# Patient Record
Sex: FEMALE | Race: OTHER | Employment: STUDENT
[De-identification: names, ages, dates, MRNs, and addresses within clinical notes are randomized per-mention and may not be internally consistent; named-entity substitution may affect disease eponyms.]

---

## 2024-10-14 ENCOUNTER — HOSPITAL ENCOUNTER (EMERGENCY)
Facility: HOSPITAL | Age: 19
Discharge: HOME | End: 2024-10-14
Payer: MEDICAID

## 2024-10-14 VITALS
DIASTOLIC BLOOD PRESSURE: 71 MMHG | BODY MASS INDEX: 26.58 KG/M2 | HEART RATE: 67 BPM | SYSTOLIC BLOOD PRESSURE: 114 MMHG | OXYGEN SATURATION: 99 % | RESPIRATION RATE: 20 BRPM | WEIGHT: 150 LBS | TEMPERATURE: 99.2 F | HEIGHT: 63 IN

## 2024-10-14 DIAGNOSIS — M72.2 PLANTAR FASCIITIS OF RIGHT FOOT: Primary | ICD-10-CM

## 2024-10-14 DIAGNOSIS — M72.2 PLANTAR FASCIITIS OF LEFT FOOT: ICD-10-CM

## 2024-10-14 PROCEDURE — 99283 EMERGENCY DEPT VISIT LOW MDM: CPT

## 2024-10-14 RX ORDER — ESCITALOPRAM OXALATE 20 MG/1
20 TABLET ORAL DAILY
COMMUNITY

## 2024-10-14 RX ORDER — IBUPROFEN 600 MG/1
600 TABLET, FILM COATED ORAL EVERY 6 HOURS PRN
Qty: 20 TABLET | Refills: 0 | Status: SHIPPED | OUTPATIENT
Start: 2024-10-14

## 2024-10-14 RX ORDER — ARIPIPRAZOLE 15 MG/1
15 TABLET ORAL DAILY
COMMUNITY

## 2024-10-14 ASSESSMENT — PAIN DESCRIPTION - DESCRIPTORS: DESCRIPTORS: SHARP;THROBBING

## 2024-10-14 ASSESSMENT — PAIN DESCRIPTION - ORIENTATION: ORIENTATION: LEFT;RIGHT

## 2024-10-14 ASSESSMENT — PAIN - FUNCTIONAL ASSESSMENT: PAIN_FUNCTIONAL_ASSESSMENT: 0-10

## 2024-10-14 ASSESSMENT — PAIN SCALES - GENERAL: PAINLEVEL_OUTOF10: 10

## 2024-10-14 ASSESSMENT — PAIN DESCRIPTION - LOCATION: LOCATION: FOOT

## 2024-10-14 NOTE — ED PROVIDER NOTES
[facility] EMERGENCY DEPT  EMERGENCY DEPARTMENT HISTORY AND PHYSICAL EXAM      Date: 10/14/2024  Patient Name: Mayra Smith  MRN: [id number]  YOB: 2005  Date of evaluation: 10/14/2024  Provider: AALIYAH Calloway NP   Note Started: 7:59 PM EDT 10/14/24    HISTORY OF PRESENT ILLNESS     Chief Complaint   Patient presents with    Foot Pain       History Provided By: Patient    HPI: Mayra Smith is a 19 y.o. female with a past medical history as listed below presents to the ER with foot pain.  Patient had bilateral foot pain.  Patient states it started about a week ago.  Patient denies any trauma.    PAST MEDICAL HISTORY   Past Medical History:  Past Medical History:   Diagnosis Date    ADHD     Anxiety     Bipolar 1 disorder (HCC)     Depression        Past Surgical History:  History reviewed. No pertinent surgical history.    Family History:  History reviewed. No pertinent family history.    Social History:  Social History     Tobacco Use    Smoking status: Never    Smokeless tobacco: Never   Vaping Use    Vaping status: Never Used   Substance Use Topics    Alcohol use: Never    Drug use: Never       Allergies:  Allergies   Allergen Reactions    Amoxicillin Anaphylaxis       PCP: No primary care provider on file.    Current Meds:   No current facility-administered medications for this encounter.     Current Outpatient Medications   Medication Sig Dispense Refill    escitalopram (LEXAPRO) 20 MG tablet Take 1 tablet by mouth daily      ARIPiprazole (ABILIFY) 15 MG tablet Take 1 tablet by mouth daily      ibuprofen (IBU) 600 MG tablet Take 1 tablet by mouth every 6 hours as needed for Pain 20 tablet 0       Social Determinants of Health:   Social Determinants of Health     Tobacco Use: Low Risk  (10/14/2024)    Patient History     Smoking Tobacco Use: Never     Smokeless Tobacco Use: Never     Passive Exposure: Not on file   Alcohol Use: Not on file   Financial Resource Strain: Not on file   Food

## 2024-10-14 NOTE — ED TRIAGE NOTES
Patient reporting bilateral foot pain that started about a week ago.  Patient reports pain starts in arches and radiates back.